# Patient Record
Sex: MALE | URBAN - METROPOLITAN AREA
[De-identification: names, ages, dates, MRNs, and addresses within clinical notes are randomized per-mention and may not be internally consistent; named-entity substitution may affect disease eponyms.]

---

## 2017-04-19 ENCOUNTER — PATIENT OUTREACH (OUTPATIENT)
Dept: FAMILY MEDICINE CLINIC | Facility: CLINIC | Age: 52
End: 2017-04-19

## 2018-10-02 ENCOUNTER — PATIENT OUTREACH (OUTPATIENT)
Dept: FAMILY MEDICINE CLINIC | Facility: CLINIC | Age: 53
End: 2018-10-02

## 2018-10-02 NOTE — PROGRESS NOTES
Patient Outreach    Letter sent to verify PCP/ No appointments with Dr. Faith Stallworth       Patient due for colonoscopy

## 2023-05-19 ENCOUNTER — HOSPITAL ENCOUNTER (EMERGENCY)
Facility: HOSPITAL | Age: 58
Discharge: HOME | End: 2023-05-19
Attending: EMERGENCY MEDICINE
Payer: COMMERCIAL

## 2023-05-19 ENCOUNTER — APPOINTMENT (OUTPATIENT)
Dept: GENERAL RADIOLOGY | Facility: HOSPITAL | Age: 58
End: 2023-05-19
Attending: EMERGENCY MEDICINE
Payer: COMMERCIAL

## 2023-05-19 VITALS
HEART RATE: 115 BPM | BODY MASS INDEX: 23.1 KG/M2 | DIASTOLIC BLOOD PRESSURE: 90 MMHG | RESPIRATION RATE: 24 BRPM | WEIGHT: 180 LBS | HEIGHT: 74 IN | OXYGEN SATURATION: 96 % | TEMPERATURE: 98 F | SYSTOLIC BLOOD PRESSURE: 123 MMHG

## 2023-05-19 DIAGNOSIS — S52.615A CLOSED NONDISPLACED FRACTURE OF STYLOID PROCESS OF LEFT ULNA, INITIAL ENCOUNTER: Primary | ICD-10-CM

## 2023-05-19 DIAGNOSIS — M25.512 ACUTE PAIN OF LEFT SHOULDER: ICD-10-CM

## 2023-05-19 LAB
ATRIAL RATE: 112 BPM
P AXIS: 76 DEGREES
P-R INTERVAL: 156 MS
Q-T INTERVAL: 324 MS
QRS DURATION: 78 MS
QTC CALCULATION (BEZET): 442 MS
R AXIS: -1 DEGREES
T AXIS: 65 DEGREES
VENTRICULAR RATE: 112 BPM

## 2023-05-19 PROCEDURE — 93005 ELECTROCARDIOGRAM TRACING: CPT

## 2023-05-19 PROCEDURE — 73070 X-RAY EXAM OF ELBOW: CPT | Performed by: EMERGENCY MEDICINE

## 2023-05-19 PROCEDURE — 73030 X-RAY EXAM OF SHOULDER: CPT | Performed by: EMERGENCY MEDICINE

## 2023-05-19 PROCEDURE — 99284 EMERGENCY DEPT VISIT MOD MDM: CPT

## 2023-05-19 PROCEDURE — 73100 X-RAY EXAM OF WRIST: CPT | Performed by: EMERGENCY MEDICINE

## 2023-05-19 PROCEDURE — 93010 ELECTROCARDIOGRAM REPORT: CPT

## 2023-05-19 PROCEDURE — 73600 X-RAY EXAM OF ANKLE: CPT | Performed by: EMERGENCY MEDICINE

## 2023-05-19 RX ORDER — HYDROCODONE BITARTRATE AND ACETAMINOPHEN 7.5; 325 MG/1; MG/1
TABLET ORAL EVERY 6 HOURS PRN
Qty: 10 TABLET | Refills: 0 | Status: SHIPPED | OUTPATIENT
Start: 2023-05-19 | End: 2023-05-24

## 2023-05-19 NOTE — ED INITIAL ASSESSMENT (HPI)
Pt arrived to ED from home c/o elevated BP x multiple days, pt reports headache, dizziness and palpitations.

## 2023-05-19 NOTE — CM/SW NOTE
Spoke to pt daughter who is requesting more PT at home for her father. Father used [facility] which was set up through [employer] [facility] [employer]. I called and spoke to [address] at [facility] and suggested that the pt needs more PT and OT. [address] states they will contact the pt physician to get the order for more therapy. Daughter is concerned about transportation home I called [facility] who states that it would $60 dollars, and if there is stairs it would be an additional $150. However, they are unable to take the patient at this time since they have one available , and the next eta is too far. I provided the pt family with the respite rate folder and also resources with with transportation resources for [facility]. Daughter states that the neighbors are going to help her get her father in the home. I told her about medicar options. She refused the service. Pt primary nurse aware.

## 2023-05-19 NOTE — ED QUICK NOTES
Per patient and wife they are also concerned about the patient's left side, particularly the wrist. Good pulses and color throughout. Hx of stroke which has left the patient with left sided weakness.

## (undated) NOTE — LETTER
10/02/18        Yamel Davistony Salcid[place]raxton Lea [id number]      Dear Rocael Jonas,     Your health insurance provider has notified our office that you have selected us as your primary care physician.  We pride ourselves in providing the best care pos